# Patient Record
Sex: FEMALE | Race: WHITE | NOT HISPANIC OR LATINO | Employment: FULL TIME | URBAN - NONMETROPOLITAN AREA
[De-identification: names, ages, dates, MRNs, and addresses within clinical notes are randomized per-mention and may not be internally consistent; named-entity substitution may affect disease eponyms.]

---

## 2019-07-14 ENCOUNTER — APPOINTMENT (OUTPATIENT)
Dept: GENERAL RADIOLOGY | Facility: HOSPITAL | Age: 39
End: 2019-07-14

## 2019-07-14 ENCOUNTER — HOSPITAL ENCOUNTER (EMERGENCY)
Facility: HOSPITAL | Age: 39
Discharge: HOME | End: 2019-07-14
Attending: EMERGENCY MEDICINE | Admitting: EMERGENCY MEDICINE

## 2019-07-14 VITALS
BODY MASS INDEX: 41.41 KG/M2 | HEIGHT: 62 IN | TEMPERATURE: 98.1 F | DIASTOLIC BLOOD PRESSURE: 97 MMHG | RESPIRATION RATE: 18 BRPM | WEIGHT: 225 LBS | HEART RATE: 75 BPM | SYSTOLIC BLOOD PRESSURE: 146 MMHG | OXYGEN SATURATION: 98 %

## 2019-07-14 DIAGNOSIS — S99.911A INJURY OF RIGHT ANKLE, INITIAL ENCOUNTER: ICD-10-CM

## 2019-07-14 DIAGNOSIS — S93.401A SPRAIN OF RIGHT ANKLE, UNSPECIFIED LIGAMENT, INITIAL ENCOUNTER: Primary | ICD-10-CM

## 2019-07-14 PROCEDURE — 99283 EMERGENCY DEPT VISIT LOW MDM: CPT

## 2019-07-14 PROCEDURE — 73630 X-RAY EXAM OF FOOT: CPT

## 2019-07-14 PROCEDURE — 73610 X-RAY EXAM OF ANKLE: CPT

## 2019-07-14 RX ORDER — IBUPROFEN 800 MG/1
800 TABLET ORAL ONCE
Status: COMPLETED | OUTPATIENT
Start: 2019-07-14 | End: 2019-07-14

## 2019-07-14 RX ADMIN — IBUPROFEN 800 MG: 800 TABLET ORAL at 19:14

## 2019-07-14 NOTE — ED PROVIDER NOTES
Subjective   Patient is a 39-year-old female with a history of migraines presenting to the ER for evaluation of an ankle injury.  Patient states that this evening she stepped outside of her front door and twisted her ankle.  She states that it rolled inward and she felt a ripping pain on the after aspect of her ankle.  She states that it is hard to bear weight.  She has noticed significant edema over the ankle, no ecchymosis.  She states she just recently got out of a walking boot from an injury she sustained her ankle on May 28.  She has been followed by  sports medicine clinic in the past.  She has not had any medication prior to her arrival.  She states that her toes are feeling tingly and numb, has a history of a hairline fracture in her right foot as well.  She states she was nauseous when this first occurred but denies any other symptoms.  She denies any pain in any other part of her leg, did not hit her head or lose consciousness with the fall.            Review of Systems   All other systems reviewed and are negative.      Past Medical History:   Diagnosis Date   • Migraines        Allergies   Allergen Reactions   • Wellbutrin [Bupropion] Rash       Past Surgical History:   Procedure Laterality Date   • APPENDECTOMY     • CHOLECYSTECTOMY     • DILATATION AND CURETTAGE     • HYSTERECTOMY         History reviewed. No pertinent family history.    Social History     Socioeconomic History   • Marital status:      Spouse name: Not on file   • Number of children: Not on file   • Years of education: Not on file   • Highest education level: Not on file   Tobacco Use   • Smoking status: Current Some Day Smoker     Packs/day: 0.50     Types: Cigarettes   Substance and Sexual Activity   • Alcohol use: Yes     Frequency: Never     Comment: social    • Drug use: No           Objective   Physical Exam   Nursing note and vitals reviewed.    GEN: No acute distress, sitting upright in stretcher.  She is awake and  alert.  She is speaking full sentences.  She does not appear toxic.  She is tearful at times during her exam  Head: Normocephalic, atraumatic  Eyes: EOM intact  Cardiovascular: Regular rate and rhythm   Lungs: Clear to auscultation bilaterally without adventitious sounds  Extremities: Left lower extremity unremarkable.  Right lower extremity has moderate edema to the right lateral ankle with moderate tenderness to palpation.  Patient is unable to flex or extend her ankle actively secondary to pain.  Sensation is intact in the right lower extremity.  There is no tenderness over the calf, no obvious point tenderness over the right foot.  Dorsalis pedis pulses 2+.  No erythema, warmth of the right ankle  Neuro: GCS 15  Psych: Mood and affect are appropriate    Procedures           ED Course  ED Course as of Jul 14 2119   Sun Jul 14, 2019   2001 Reviewed the x-rays with Dr. Hernandez.  Do not see any acute fracture.  Discussed the patient that she is likely strained or damaged the ligaments over the ankle.  Patient states she has crutches in a walking boot at home which her  is going to get.  We discussed symptomatic treatment.  We will give her a disc so she can follow back up with her sports medicine physician.  We discussed symptomatic care, follow-up and strict return precautions.  [LA]      ED Course User Index  [LA] Kaitlin Miller PA-C                  MDM  Number of Diagnoses or Management Options  Injury of right ankle, initial encounter:   Sprain of right ankle, unspecified ligament, initial encounter:   Diagnosis management comments: On arrival, patient is hypertensive, appears upset but no acute distress.  Differential includes strain, sprain, dislocation, fracture, contusion, and other concerns.  Will give ibuprofen for pain and obtain xrays to evaluate further.    There is moderate swelling to the right lateral ankle on x-ray, questionable avulsion fracture.  Discussed these findings with   David he reviewed the x-rays.  Advised patient wear her boot again for comfort and stabilization and use crutches to rest the ankle.  We discussed symptomatic treatment for what is likely a sprain.  She was given a disc so she can follow-up with her images at her orthopedic surgeon.  We discussed very strict return precautions and follow-up instructions.  She verbalized understanding was in agreement this plan of care.       Amount and/or Complexity of Data Reviewed  Tests in the radiology section of CPT®: reviewed and ordered  Discussion of test results with the performing providers: yes  Review and summarize past medical records: yes  Discuss the patient with other providers: yes  Independent visualization of images, tracings, or specimens: yes    Risk of Complications, Morbidity, and/or Mortality  Presenting problems: low  Diagnostic procedures: low  Management options: low    Patient Progress  Patient progress: stable        Final diagnoses:   Sprain of right ankle, unspecified ligament, initial encounter   Injury of right ankle, initial encounter            Kaitlin Miller PA-C  07/14/19 3203

## 2019-07-15 NOTE — DISCHARGE INSTRUCTIONS
Wear boot for comfort.  Use crutches to help rest the ankle.  Elevate when possible.  Apply ice for 10 to 15-minute increments 3-4 times per day.  May take over-the-counter ibuprofen and Tylenol to help with pain-400 mg ibuprofen and or 500 mg Tylenol every 6 hours.  Need to follow back up with your sports medicine physician as early as possible to reevaluate your symptoms.  Follow-up with your PCP as needed.  Return to the ER for any change, worsening symptoms, or any additional concerns.

## 2021-01-04 ENCOUNTER — TRANSCRIBE ORDERS (OUTPATIENT)
Dept: ADMINISTRATIVE | Facility: HOSPITAL | Age: 41
End: 2021-01-04

## 2021-01-04 DIAGNOSIS — R59.0 AXILLARY LYMPHADENOPATHY: Primary | ICD-10-CM

## 2021-01-17 ENCOUNTER — APPOINTMENT (OUTPATIENT)
Dept: GENERAL RADIOLOGY | Facility: HOSPITAL | Age: 41
End: 2021-01-17

## 2021-01-17 ENCOUNTER — HOSPITAL ENCOUNTER (EMERGENCY)
Facility: HOSPITAL | Age: 41
Discharge: HOME | End: 2021-01-17
Attending: EMERGENCY MEDICINE | Admitting: EMERGENCY MEDICINE

## 2021-01-17 VITALS
TEMPERATURE: 97.9 F | HEART RATE: 103 BPM | DIASTOLIC BLOOD PRESSURE: 108 MMHG | SYSTOLIC BLOOD PRESSURE: 144 MMHG | BODY MASS INDEX: 38.38 KG/M2 | OXYGEN SATURATION: 98 % | WEIGHT: 224.8 LBS | HEIGHT: 64 IN | RESPIRATION RATE: 18 BRPM

## 2021-01-17 DIAGNOSIS — T18.9XXA SWALLOWED FOREIGN BODY, INITIAL ENCOUNTER: Primary | ICD-10-CM

## 2021-01-17 PROCEDURE — 74018 RADEX ABDOMEN 1 VIEW: CPT

## 2021-01-17 PROCEDURE — 99282 EMERGENCY DEPT VISIT SF MDM: CPT

## 2021-01-17 PROCEDURE — 71045 X-RAY EXAM CHEST 1 VIEW: CPT

## 2021-01-17 RX ORDER — NICOTINE POLACRILEX 2 MG
GUM BUCCAL
COMMUNITY

## 2021-01-17 RX ORDER — MULTIVIT WITH MINERALS/LUTEIN
250 TABLET ORAL DAILY
COMMUNITY

## 2021-01-17 NOTE — ED PROVIDER NOTES
Subjective   40-year-old female presents after swallowing the wire from her permanent retainer yesterday, she was eating chips, and the wire broke loose, she swallowed it with her potato chips.  She got it down with no problem, but she is now concerned because as a sharp object, she called her dentist who stated she should be able to pass it with no problems, but she is concerned and would like to make sure that everything is okay.  She denies any trouble swallowing, no abdominal pain no nausea vomiting fever chills.      History provided by:  Patient   used: No        Review of Systems   Gastrointestinal:        Swallowed foreign body   All other systems reviewed and are negative.      Past Medical History:   Diagnosis Date   • Migraines        Allergies   Allergen Reactions   • Wellbutrin [Bupropion] Rash       Past Surgical History:   Procedure Laterality Date   • APPENDECTOMY     • CHOLECYSTECTOMY     • DILATATION AND CURETTAGE     • FOOT SURGERY     • HYSTERECTOMY         History reviewed. No pertinent family history.    Social History     Socioeconomic History   • Marital status:      Spouse name: Not on file   • Number of children: Not on file   • Years of education: Not on file   • Highest education level: Not on file   Tobacco Use   • Smoking status: Current Some Day Smoker     Packs/day: 0.50     Types: Cigarettes   Substance and Sexual Activity   • Alcohol use: Yes     Frequency: Never     Comment: social    • Drug use: No           Objective   Physical Exam  Vitals signs and nursing note reviewed.   Constitutional:       Appearance: She is well-developed.   HENT:      Head: Normocephalic.   Neck:      Musculoskeletal: Normal range of motion and neck supple.   Cardiovascular:      Rate and Rhythm: Normal rate and regular rhythm.   Pulmonary:      Effort: Pulmonary effort is normal.      Breath sounds: Normal breath sounds.   Abdominal:      Palpations: Abdomen is soft.    Musculoskeletal: Normal range of motion.   Skin:     General: Skin is warm and dry.   Neurological:      Mental Status: She is alert and oriented to person, place, and time.      Deep Tendon Reflexes: Reflexes are normal and symmetric.         Procedures           ED Course  ED Course as of Jan 17 1434   Sun Jan 17, 2021   1422 Patient works for colorectal surgery at [employer], she will follow-up with the physicians in colorectal surgery    [CS]      ED Course User Index  [CS] Franco Villareal Jr., PA-C                                           MDM  Number of Diagnoses or Management Options  Swallowed foreign body, initial encounter: new and requires workup     Amount and/or Complexity of Data Reviewed  Tests in the radiology section of CPT®: reviewed    Risk of Complications, Morbidity, and/or Mortality  Presenting problems: minimal  Diagnostic procedures: minimal  Management options: minimal    Patient Progress  Patient progress: stable      Final diagnoses:   Swallowed foreign body, initial encounter            Franco Villareal Jr., PA-C  01/17/21 1439

## 2021-02-02 ENCOUNTER — HOSPITAL ENCOUNTER (OUTPATIENT)
Dept: ULTRASOUND IMAGING | Facility: HOSPITAL | Age: 41
Discharge: HOME | End: 2021-02-02

## 2021-02-02 ENCOUNTER — HOSPITAL ENCOUNTER (OUTPATIENT)
Dept: MAMMOGRAPHY | Facility: HOSPITAL | Age: 41
Discharge: HOME | End: 2021-02-02

## 2021-02-02 DIAGNOSIS — R59.0 AXILLARY LYMPHADENOPATHY: ICD-10-CM

## 2021-02-02 PROCEDURE — G0279 TOMOSYNTHESIS, MAMMO: HCPCS

## 2021-02-02 PROCEDURE — 77066 DX MAMMO INCL CAD BI: CPT

## 2021-02-02 PROCEDURE — 76882 US LMTD JT/FCL EVL NVASC XTR: CPT

## 2021-06-01 ENCOUNTER — TRANSCRIBE ORDERS (OUTPATIENT)
Dept: GENERAL RADIOLOGY | Facility: HOSPITAL | Age: 41
End: 2021-06-01

## 2021-06-01 ENCOUNTER — HOSPITAL ENCOUNTER (OUTPATIENT)
Dept: GENERAL RADIOLOGY | Facility: HOSPITAL | Age: 41
Discharge: HOME | End: 2021-06-01
Admitting: INTERNAL MEDICINE

## 2021-06-01 DIAGNOSIS — M25.531 RIGHT WRIST PAIN: ICD-10-CM

## 2021-06-01 DIAGNOSIS — M25.531 RIGHT WRIST PAIN: Primary | ICD-10-CM

## 2021-06-01 PROCEDURE — 73070 X-RAY EXAM OF ELBOW: CPT

## 2021-06-01 PROCEDURE — 73110 X-RAY EXAM OF WRIST: CPT

## 2021-09-09 ENCOUNTER — TRANSCRIBE ORDERS (OUTPATIENT)
Dept: LAB | Facility: HOSPITAL | Age: 41
End: 2021-09-09

## 2021-09-09 DIAGNOSIS — R79.89 OTHER SPECIFIED ABNORMAL FINDINGS OF BLOOD CHEMISTRY: Primary | ICD-10-CM

## 2023-09-05 ENCOUNTER — OFFICE VISIT (OUTPATIENT)
Dept: ORTHOPEDIC SURGERY | Facility: CLINIC | Age: 43
End: 2023-09-05
Payer: COMMERCIAL

## 2023-09-05 VITALS
BODY MASS INDEX: 37.22 KG/M2 | SYSTOLIC BLOOD PRESSURE: 151 MMHG | WEIGHT: 218 LBS | HEIGHT: 64 IN | DIASTOLIC BLOOD PRESSURE: 96 MMHG

## 2023-09-05 DIAGNOSIS — S62.656A CLOSED NONDISPLACED FRACTURE OF MIDDLE PHALANX OF RIGHT LITTLE FINGER, INITIAL ENCOUNTER: Primary | ICD-10-CM

## 2023-09-05 RX ORDER — OMEPRAZOLE 40 MG/1
40 CAPSULE, DELAYED RELEASE ORAL DAILY
COMMUNITY

## 2023-09-05 RX ORDER — IBUPROFEN 800 MG/1
TABLET ORAL
COMMUNITY
Start: 2023-09-04

## 2023-09-05 NOTE — PROGRESS NOTES
Office Note     Name: Mayte Diehl    : 1980     MRN: [id number]     Chief Complaint  Injury of the Right Hand (States she was [place]aking [place] [employer], she tried to use it as a paddle board and she fell on 9/3/23. Went to urgent care 23, presents in splint.)    Subjective     History of Present Illness:  Mayte Diehl is a 43 y.o. female presenting today for right 5th digit fracture, mechanism described above.  Patient had x-rays at urgent care following the event which revealed a subtle fracture of the middle phalanx of the right fifth digit base.  She states most of her pain is in the hyperthenar eminence of her hand and she only has mild symptoms in the right fifth digit.  She denies any other injuries at this time.  She was placed in a ulnar gutter splint at the urgent care.  She denies any previous injuries or surgery to the affected area.    Review of Systems   Constitutional:  Negative for fever.   HENT:  Negative for dental problem and voice change.    Eyes:  Negative for visual disturbance.   Respiratory:  Negative for shortness of breath.    Cardiovascular:  Negative for chest pain.   Gastrointestinal:  Negative for abdominal pain.   Genitourinary:  Negative for dysuria.   Musculoskeletal:  Positive for arthralgias (right hand). Negative for gait problem and joint swelling.   Skin:  Negative for rash.   Neurological:  Negative for speech difficulty.   Hematological:  Does not bruise/bleed easily.   Psychiatric/Behavioral:  Negative for confusion.       Past Medical History:   Diagnosis Date    Migraines         Past Surgical History:   Procedure Laterality Date    APPENDECTOMY      CHOLECYSTECTOMY      DILATATION AND CURETTAGE      FOOT SURGERY Right 2019    Ankle reconstruction done by Dr. Recinos at     HYSTERECTOMY         History reviewed. No pertinent family history.    Social History     Socioeconomic History    Marital status:    Tobacco Use    Smoking status:  "Some Days     Packs/day: 0.50     Types: Cigarettes   Substance and Sexual Activity    Alcohol use: Yes     Comment: social     Drug use: No         Current Outpatient Medications:     ibuprofen (ADVIL,MOTRIN) 800 MG tablet, , Disp: , Rfl:     Multiple Vitamin (MULTIVITAMINS PO), Take  by mouth., Disp: , Rfl:     omeprazole (priLOSEC) 40 MG capsule, Take 1 capsule by mouth Daily., Disp: , Rfl:     Rizatriptan Benzoate (MAXALT PO), Maxalt, Disp: , Rfl:     VITAMIN D PO, Take  by mouth., Disp: , Rfl:     Allergies   Allergen Reactions    Wellbutrin [Bupropion] Rash           Objective   /96   Ht 162.6 cm (64.02\")   Wt 98.9 kg (218 lb)   BMI 37.40 kg/m²    Class 2 Severe Obesity (BMI >=35 and <=39.9). Obesity-related health conditions include the following: none. Obesity is unchanged. BMI is is above average; BMI management plan is completed. We discussed low calorie, low carb based diet program, portion control, increasing exercise, and joining a fitness center or start home based exercise program.       Physical Exam  Right Hand Exam     Tenderness   Right hand tenderness location: Hypothenar eminence, base of middle phalanx of right fifth digit.    Range of Motion   The patient has normal right wrist ROM.     Other   Erythema: absent  Sensation: normal  Pulse: present    Comments:  There is no significant swelling bruising erythema.  Patient has tenderness palpation along the hypothenar eminence as well as the base of the middle phalanx of the right fifth digit.  She has full range of motion of the wrist without pain.  Right fifth digit flexion is decreased to approximately 50% due to pain.  Extension is normal.  Carpal pulses 2+, cap refill brisk to each digit, gross sensation intact light touch.         Extremity DVT signs are negative by clinical screen.     Independent Review of Radiographic Studies:    Reviewed images and agree with radiologist interpretation.    Reviewed images and I agree with the " radiologist or potation.  There is a subtle nondisplaced fracture of the radial aspect of the middle phalanx of the right fifth digit that does extend into the PIP joint.  No other acute osseous abnormalities were noted.    Procedures    Assessment and Plan   Diagnoses and all orders for this visit:    1. Closed nondisplaced fracture of middle phalanx of right little finger, initial encounter (Primary)       Regular exercise as tolerated  Orthopedic activities reviewed and patient expressed appreciation  Discussion of orthopedic goals  Risk, benefits, and merits of treatment alternatives reviewed with the patient and questions answered  Use brace as instructed  Elevate hand for residual swelling  Weight bearing parameters reviewed  Ice, heat, and/or modalities as beneficial  Guided on proper techniques for mobility, strength, agility and/or conditioning exercises    Recommendations/Plan:  Exercise, medications, injections, other patient advice, and return appointment as noted.  Brace: Finger splint.  Patient is encouraged to call or return for any issues or concerns.    Patient was placed in aluminum finger splint in her right fifth digit was buddy taped to her right fourth digit.  We will continue finger splint for 4 weeks.  Advised over-the-counter analgesics and RICE as needed for swelling and pain.  If patient continues to have significant pain at the hyperthenar eminence with normal x-rays consider MRI for evaluation of the soft tissue.  Recheck scheduled for 2 weeks we will repeat x-rays at that time to evaluate for healing and/or interval displacement.  Advised patient to call or return office sooner if needed.    Return in about 2 weeks (around 9/19/2023) for XOA.  Patient agreeable to call or return sooner for any concerns.

## 2023-09-19 ENCOUNTER — OFFICE VISIT (OUTPATIENT)
Dept: ORTHOPEDIC SURGERY | Facility: CLINIC | Age: 43
End: 2023-09-19
Payer: COMMERCIAL

## 2023-09-19 DIAGNOSIS — S62.656A CLOSED NONDISPLACED FRACTURE OF MIDDLE PHALANX OF RIGHT LITTLE FINGER, INITIAL ENCOUNTER: Primary | ICD-10-CM

## 2023-09-19 NOTE — PROGRESS NOTES
Office Note     Name: Mayte Diehl    : 1980     MRN: [id number]     Chief Complaint  Follow-up of the Right Hand (Closed nondisplaced fracture of middle phalanx of right little finger, DOI: 9/3/23.)    Subjective     History of Present Illness:  Mayte Diehl is a 43 y.o. female presenting today for 2-week follow-up for closed nondisplaced fracture of the middle phalanx of the right little finger.  Patient states that her pain is improved although she does continue to have mild soreness and mild pain with movement of the right little finger.  She reports good compliance with finger guard and alla loop.  She denies any new or worsening symptoms.  She states the pain along her hypothenar eminence has significantly improved and she no longer has any significant symptoms in that area.    Review of Systems   Constitutional:  Negative for fever.   HENT:  Negative for dental problem and voice change.    Eyes:  Negative for visual disturbance.   Respiratory:  Negative for shortness of breath.    Cardiovascular:  Negative for chest pain.   Gastrointestinal:  Negative for abdominal pain.   Genitourinary:  Negative for dysuria.   Musculoskeletal:  Positive for arthralgias (right little finger). Negative for gait problem and joint swelling.   Skin:  Negative for rash.   Neurological:  Negative for speech difficulty.   Hematological:  Does not bruise/bleed easily.   Psychiatric/Behavioral:  Negative for confusion.       Past Medical History:   Diagnosis Date    Migraines         Past Surgical History:   Procedure Laterality Date    APPENDECTOMY      CHOLECYSTECTOMY      DILATATION AND CURETTAGE      FOOT SURGERY Right 2019    Ankle reconstruction done by Dr. Recinos at     HYSTERECTOMY         History reviewed. No pertinent family history.    Social History     Socioeconomic History    Marital status:    Tobacco Use    Smoking status: Some Days     Packs/day: 0.50     Types: Cigarettes   Substance  and Sexual Activity    Alcohol use: Yes     Comment: social     Drug use: No         Current Outpatient Medications:     ibuprofen (ADVIL,MOTRIN) 800 MG tablet, , Disp: , Rfl:     Multiple Vitamin (MULTIVITAMINS PO), Take  by mouth., Disp: , Rfl:     omeprazole (priLOSEC) 40 MG capsule, Take 1 capsule by mouth Daily., Disp: , Rfl:     Rizatriptan Benzoate (MAXALT PO), Maxalt, Disp: , Rfl:     VITAMIN D PO, Take  by mouth., Disp: , Rfl:     Allergies   Allergen Reactions    Wellbutrin [Bupropion] Rash           Objective   There were no vitals taken for this visit.           Physical Exam  Right Hand Exam     Comments:  Mild tenderness to palpation along the radial aspect of the PIP joint of the right fifth digit.  She has full range of motion with slight increase in pain and range of motion.  There is no significant swelling bruising erythema.  Gross sensation intact light touch, cap refill brisk to digit.         Extremity DVT signs are negative by clinical screen.     Independent Review of Radiographic Studies:    Three-view plain films of the right fingers were done in office today for the evaluation of fracture healing, no comparison views available, independently reviewed by me.  No discrete fracture was noted however there may be a subtle nondisplaced fracture of the radial aspect of the middle phalanx of the right fifth digit that appears to extend into the PIP joint.    Procedures    Assessment and Plan   Diagnoses and all orders for this visit:    1. Closed nondisplaced fracture of middle phalanx of right little finger, initial encounter (Primary)  -     XR Finger 2+ View Right; Future       Regular exercise as tolerated  Orthopedic activities reviewed and patient expressed appreciation  Discussion of orthopedic goals  Risk, benefits, and merits of treatment alternatives reviewed with the patient and questions answered  Use brace as instructed  Ice, heat, and/or modalities as beneficial  Guided on proper  techniques for mobility, strength, agility and/or conditioning exercises    Recommendations/Plan:  Exercise, medications, injections, other patient advice, and return appointment as noted.  Patient is encouraged to call or return for any issues or concerns.    Given that no discrete fracture was noted today patient was advised to continue with buddy looping however encouraged that she may discontinue the finger guard unless she is doing activities where she may accidentally hit her hand against something.  Recheck is scheduled for 3 weeks we will repeat x-rays at that time to evaluate for fracture, advised to call or return office sooner if needed.  I encouraged over-the-counter analgesics and ice to the area as needed and beneficial.  Patient is agreeable with plan.    Return in about 3 weeks (around 10/10/2023) for XOA.  Patient agreeable to call or return sooner for any concerns.

## 2023-10-10 DIAGNOSIS — S62.656A CLOSED NONDISPLACED FRACTURE OF MIDDLE PHALANX OF RIGHT LITTLE FINGER, INITIAL ENCOUNTER: Primary | ICD-10-CM
